# Patient Record
Sex: FEMALE | Race: WHITE | Employment: OTHER | URBAN - METROPOLITAN AREA
[De-identification: names, ages, dates, MRNs, and addresses within clinical notes are randomized per-mention and may not be internally consistent; named-entity substitution may affect disease eponyms.]

---

## 2017-12-05 ENCOUNTER — EST. PATIENT EMERGENCY (OUTPATIENT)
Dept: URBAN - METROPOLITAN AREA CLINIC 36 | Facility: CLINIC | Age: 77
End: 2017-12-05

## 2017-12-05 DIAGNOSIS — H02.003: ICD-10-CM

## 2017-12-05 DIAGNOSIS — H02.105: ICD-10-CM

## 2017-12-05 DIAGNOSIS — H02.006: ICD-10-CM

## 2017-12-05 DIAGNOSIS — H02.102: ICD-10-CM

## 2017-12-05 PROCEDURE — G8482 FLU IMMUNIZE ORDER/ADMIN: HCPCS

## 2017-12-05 PROCEDURE — G8785 BP SCRN NO PERF AT INTERVAL: HCPCS

## 2017-12-05 PROCEDURE — 4040F PNEUMOC VAC/ADMIN/RCVD: CPT

## 2017-12-05 PROCEDURE — 99211 OFF/OP EST MAY X REQ PHY/QHP: CPT

## 2017-12-05 PROCEDURE — 1036F TOBACCO NON-USER: CPT

## 2017-12-05 PROCEDURE — G8427 DOCREV CUR MEDS BY ELIG CLIN: HCPCS

## 2017-12-05 ASSESSMENT — TONOMETRY
OD_IOP_MMHG: 18
OS_IOP_MMHG: 19

## 2017-12-05 ASSESSMENT — VISUAL ACUITY
OD_SC: 20/50
OS_SC: 20/100+1
OD_PH: 20/25
OS_PH: 20/25

## 2017-12-07 ENCOUNTER — CONSULT (OUTPATIENT)
Dept: URBAN - METROPOLITAN AREA CLINIC 36 | Facility: CLINIC | Age: 77
End: 2017-12-07

## 2017-12-07 DIAGNOSIS — H02.102: ICD-10-CM

## 2017-12-07 DIAGNOSIS — H02.105: ICD-10-CM

## 2017-12-07 PROCEDURE — 99213 OFFICE O/P EST LOW 20 MIN: CPT

## 2017-12-07 ASSESSMENT — VISUAL ACUITY
OD_CC: 20/20
OS_CC: 20/25

## 2018-02-22 ENCOUNTER — POST-OP (OUTPATIENT)
Dept: URBAN - METROPOLITAN AREA CLINIC 43 | Facility: CLINIC | Age: 78
End: 2018-02-22

## 2018-02-22 DIAGNOSIS — Z98.890: ICD-10-CM

## 2018-02-22 PROCEDURE — 99024 POSTOP FOLLOW-UP VISIT: CPT

## 2018-02-22 ASSESSMENT — VISUAL ACUITY
OD_CC: 20/30
OS_CC: 20/30-1

## 2018-06-14 ENCOUNTER — ESTABLISHED COMPREHENSIVE EXAM (OUTPATIENT)
Dept: URBAN - METROPOLITAN AREA CLINIC 36 | Facility: CLINIC | Age: 78
End: 2018-06-14

## 2018-06-14 DIAGNOSIS — H52.7: ICD-10-CM

## 2018-06-14 PROCEDURE — 92015 DETERMINE REFRACTIVE STATE: CPT

## 2018-06-14 PROCEDURE — 92014 COMPRE OPH EXAM EST PT 1/>: CPT

## 2018-06-14 ASSESSMENT — VISUAL ACUITY
OD_CC: J1+
OS_CC: J1+
OS_CC: 20/20
OD_CC: 20/20

## 2018-06-14 ASSESSMENT — TONOMETRY
OS_IOP_MMHG: 17
OS_IOP_MMHG: 16
OD_IOP_MMHG: 15
OD_IOP_MMHG: 16

## 2018-06-21 ENCOUNTER — ESTABLISHED PATIENT (OUTPATIENT)
Dept: URBAN - METROPOLITAN AREA CLINIC 36 | Facility: CLINIC | Age: 78
End: 2018-06-21

## 2018-06-21 DIAGNOSIS — H40.013: ICD-10-CM

## 2018-06-21 DIAGNOSIS — M31.6: ICD-10-CM

## 2018-06-21 PROCEDURE — 92014 COMPRE OPH EXAM EST PT 1/>: CPT

## 2018-06-21 PROCEDURE — 92133 CPTRZD OPH DX IMG PST SGM ON: CPT

## 2018-06-21 ASSESSMENT — VISUAL ACUITY
OD_CC: 20/20-1
OD_CC: J1
OS_CC: J2
OS_CC: 20/20-2

## 2018-06-21 ASSESSMENT — TONOMETRY
OS_IOP_MMHG: 16
OD_IOP_MMHG: 16

## 2019-03-18 ENCOUNTER — IOP CHECK (OUTPATIENT)
Dept: URBAN - METROPOLITAN AREA CLINIC 36 | Facility: CLINIC | Age: 79
End: 2019-03-18

## 2019-03-18 DIAGNOSIS — H40.013: ICD-10-CM

## 2019-03-18 DIAGNOSIS — M31.6: ICD-10-CM

## 2019-03-18 PROCEDURE — 92012 INTRM OPH EXAM EST PATIENT: CPT

## 2019-03-18 PROCEDURE — 92083 EXTENDED VISUAL FIELD XM: CPT

## 2019-03-18 ASSESSMENT — VISUAL ACUITY
OS_CC: 20/25
OD_CC: 20/20

## 2019-03-18 ASSESSMENT — TONOMETRY
OD_IOP_MMHG: 16
OS_IOP_MMHG: 15

## 2019-06-13 ENCOUNTER — ESTABLISHED COMPREHENSIVE EXAM (OUTPATIENT)
Dept: URBAN - METROPOLITAN AREA CLINIC 36 | Facility: CLINIC | Age: 79
End: 2019-06-13

## 2019-06-13 DIAGNOSIS — H52.7: ICD-10-CM

## 2019-06-13 DIAGNOSIS — H40.013: ICD-10-CM

## 2019-06-13 DIAGNOSIS — H26.493: ICD-10-CM

## 2019-06-13 PROCEDURE — 92014 COMPRE OPH EXAM EST PT 1/>: CPT

## 2019-06-13 PROCEDURE — 92015 DETERMINE REFRACTIVE STATE: CPT

## 2019-06-13 ASSESSMENT — TONOMETRY
OD_IOP_MMHG: 17
OS_IOP_MMHG: 18
OD_IOP_MMHG: 20
OS_IOP_MMHG: 20

## 2019-06-13 ASSESSMENT — VISUAL ACUITY
OS_CC: 20/50
OD_CC: 20/20
OS_CC: J2
OD_BAT: 20/70
OS_BAT: 20/60
OD_SC: 20/25
OS_SC: 20/400
OS_PH: 20/25
OS_SC: <J12
OD_CC: J1+
OD_SC: J6

## 2019-11-14 ENCOUNTER — EST. PATIENT EMERGENCY (OUTPATIENT)
Dept: URBAN - METROPOLITAN AREA CLINIC 36 | Facility: CLINIC | Age: 79
End: 2019-11-14

## 2019-11-14 DIAGNOSIS — H02.105: ICD-10-CM

## 2019-11-14 PROCEDURE — 92012 INTRM OPH EXAM EST PATIENT: CPT

## 2019-11-18 ASSESSMENT — VISUAL ACUITY
OS_CC: 20/20-2
OD_CC: 20/20-1

## 2019-11-18 ASSESSMENT — TONOMETRY
OD_IOP_MMHG: 14
OS_IOP_MMHG: 16

## 2019-11-21 ENCOUNTER — ESTABLISHED PATIENT (OUTPATIENT)
Dept: URBAN - METROPOLITAN AREA CLINIC 36 | Facility: CLINIC | Age: 79
End: 2019-11-21

## 2019-11-21 DIAGNOSIS — H02.105: ICD-10-CM

## 2019-11-21 PROCEDURE — 99212 OFFICE O/P EST SF 10 MIN: CPT

## 2019-11-21 PROCEDURE — 92285 EXTERNAL OCULAR PHOTOGRAPHY: CPT

## 2019-11-21 ASSESSMENT — VISUAL ACUITY
OS_CC: 20/20
OD_CC: 20/20

## 2020-02-13 ENCOUNTER — PRE-OP/H&P (OUTPATIENT)
Dept: URBAN - METROPOLITAN AREA CLINIC 36 | Facility: CLINIC | Age: 80
End: 2020-02-13

## 2020-02-13 DIAGNOSIS — H02.105: ICD-10-CM

## 2020-02-13 PROCEDURE — 99211HP H&P OFFICE/OUTPATIENT VISIT, EST

## 2020-02-13 RX ORDER — ERYTHROMYCIN 5 MG/G
OINTMENT OPHTHALMIC
Start: 2020-03-02

## 2020-02-13 RX ORDER — TRAMADOL HCL 50 MG/1
1 TABLET ORAL
Start: 2020-03-02

## 2020-03-02 ENCOUNTER — PRE-OP/H&P (OUTPATIENT)
Dept: URBAN - METROPOLITAN AREA SURGERY 14 | Facility: SURGERY | Age: 80
End: 2020-03-02

## 2020-03-02 ENCOUNTER — SURGERY/PROCEDURE (OUTPATIENT)
Dept: URBAN - METROPOLITAN AREA SURGERY 14 | Facility: SURGERY | Age: 80
End: 2020-03-02

## 2020-03-02 DIAGNOSIS — H02.105: ICD-10-CM

## 2020-03-02 PROCEDURE — 67950 REVISION OF EYELID: CPT

## 2020-03-02 PROCEDURE — 68320 REVISE/GRAFT EYELID LINING: CPT

## 2020-03-02 PROCEDURE — 99499 UNLISTED E&M SERVICE: CPT

## 2020-03-02 PROCEDURE — 68440 SNIP INC LACRIMAL PUNCTUM: CPT

## 2020-03-12 ENCOUNTER — POST-OP (OUTPATIENT)
Dept: URBAN - METROPOLITAN AREA CLINIC 44 | Facility: CLINIC | Age: 80
End: 2020-03-12

## 2020-03-12 DIAGNOSIS — Z98.890: ICD-10-CM

## 2020-03-12 PROCEDURE — 99024 POSTOP FOLLOW-UP VISIT: CPT

## 2020-03-12 ASSESSMENT — VISUAL ACUITY
OD_SC: 20/20
OS_SC: 20/20

## 2020-09-17 ENCOUNTER — ESTABLISHED COMPREHENSIVE EXAM (OUTPATIENT)
Dept: URBAN - METROPOLITAN AREA CLINIC 36 | Facility: CLINIC | Age: 80
End: 2020-09-17

## 2020-09-17 DIAGNOSIS — H40.013: ICD-10-CM

## 2020-09-17 DIAGNOSIS — H50.21: ICD-10-CM

## 2020-09-17 DIAGNOSIS — H02.105: ICD-10-CM

## 2020-09-17 DIAGNOSIS — H52.7: ICD-10-CM

## 2020-09-17 DIAGNOSIS — H04.123: ICD-10-CM

## 2020-09-17 DIAGNOSIS — H26.493: ICD-10-CM

## 2020-09-17 PROCEDURE — 92014 COMPRE OPH EXAM EST PT 1/>: CPT

## 2020-09-17 PROCEDURE — 92015 DETERMINE REFRACTIVE STATE: CPT

## 2020-09-17 ASSESSMENT — TONOMETRY
OD_IOP_MMHG: 16
OS_IOP_MMHG: 18
OS_IOP_MMHG: 16
OD_IOP_MMHG: 17

## 2020-09-17 ASSESSMENT — VISUAL ACUITY
OS_CC: 20/20-1
OD_SC: 20/30
OD_CC: J1
OS_SC: 20/200
OS_CC: J1
OD_CC: 20/25-1
OD_SC: J5
OS_SC: >J12

## 2022-05-04 ENCOUNTER — COMPREHENSIVE EXAM (OUTPATIENT)
Dept: URBAN - METROPOLITAN AREA CLINIC 36 | Facility: CLINIC | Age: 82
End: 2022-05-04

## 2022-05-04 DIAGNOSIS — H16.143: ICD-10-CM

## 2022-05-04 DIAGNOSIS — Z96.1: ICD-10-CM

## 2022-05-04 DIAGNOSIS — H40.013: ICD-10-CM

## 2022-05-04 DIAGNOSIS — H52.7: ICD-10-CM

## 2022-05-04 DIAGNOSIS — H02.105: ICD-10-CM

## 2022-05-04 DIAGNOSIS — H50.21: ICD-10-CM

## 2022-05-04 DIAGNOSIS — H04.562: ICD-10-CM

## 2022-05-04 DIAGNOSIS — H04.123: ICD-10-CM

## 2022-05-04 DIAGNOSIS — H26.493: ICD-10-CM

## 2022-05-04 PROCEDURE — 92015 DETERMINE REFRACTIVE STATE: CPT

## 2022-05-04 PROCEDURE — 92014 COMPRE OPH EXAM EST PT 1/>: CPT

## 2022-05-04 PROCEDURE — A4262 TEMPORARY TEAR DUCT PLUG: HCPCS

## 2022-05-04 PROCEDURE — 68761Q PUNCTAL PLUG/QUINTESS DISSOLVABLE PLUG/EACH

## 2022-05-04 ASSESSMENT — VISUAL ACUITY
OD_SC: 20/60-1
OD_CC: J2
OS_SC: 20/200
OD_CC: 20/30-1
OS_CC: 20/25-1
OS_CC: J2

## 2022-05-04 ASSESSMENT — TONOMETRY
OD_IOP_MMHG: 10
OS_IOP_MMHG: 10

## 2022-05-10 NOTE — PATIENT DISCUSSION
Continue care with PCP [facility]. Monitor blood sugar and A1C levels. Recommended yearly dilated eye exams to monitor for ocular complications.

## 2022-12-12 ENCOUNTER — ESTABLISHED PATIENT (OUTPATIENT)
Dept: URBAN - METROPOLITAN AREA CLINIC 36 | Facility: CLINIC | Age: 82
End: 2022-12-12

## 2022-12-12 PROCEDURE — A4262 TEMPORARY TEAR DUCT PLUG: HCPCS

## 2022-12-12 PROCEDURE — 92012 INTRM OPH EXAM EST PATIENT: CPT

## 2022-12-12 PROCEDURE — 68761Q PUNCTAL PLUG/QUINTESS DISSOLVABLE PLUG/EACH

## 2022-12-12 ASSESSMENT — TONOMETRY
OS_IOP_MMHG: 17
OD_IOP_MMHG: 17

## 2022-12-12 ASSESSMENT — VISUAL ACUITY
OS_CC: 20/25
OD_CC: 20/25-1

## 2023-06-13 ENCOUNTER — COMPREHENSIVE EXAM (OUTPATIENT)
Dept: URBAN - METROPOLITAN AREA CLINIC 36 | Facility: CLINIC | Age: 83
End: 2023-06-13

## 2023-06-13 DIAGNOSIS — H40.013: ICD-10-CM

## 2023-06-13 DIAGNOSIS — H50.21: ICD-10-CM

## 2023-06-13 DIAGNOSIS — H16.223: ICD-10-CM

## 2023-06-13 DIAGNOSIS — Z98.890: ICD-10-CM

## 2023-06-13 DIAGNOSIS — H16.143: ICD-10-CM

## 2023-06-13 DIAGNOSIS — H26.493: ICD-10-CM

## 2023-06-13 DIAGNOSIS — H52.7: ICD-10-CM

## 2023-06-13 DIAGNOSIS — Z96.1: ICD-10-CM

## 2023-06-13 DIAGNOSIS — H02.105: ICD-10-CM

## 2023-06-13 DIAGNOSIS — H04.123: ICD-10-CM

## 2023-06-13 PROCEDURE — 6876050 CLOSURE OF LAC PUNCTUM BILATERAL

## 2023-06-13 PROCEDURE — 92014 COMPRE OPH EXAM EST PT 1/>: CPT

## 2023-06-13 PROCEDURE — 92015 DETERMINE REFRACTIVE STATE: CPT

## 2023-06-13 PROCEDURE — 68761Q PUNCTAL PLUG/QUINTESS DISSOLVABLE PLUG/EACH

## 2023-06-13 ASSESSMENT — TONOMETRY
OD_IOP_MMHG: 11
OS_IOP_MMHG: 13

## 2023-06-13 ASSESSMENT — VISUAL ACUITY
OD_SC: J>10
OD_CC: 20/20-1
OS_CC: J3
OS_SC: J>10
OS_SC: 20/200
OS_CC: 20/20-1
OD_SC: 20/60+2
OD_CC: J2

## 2023-12-14 ENCOUNTER — FOLLOW UP (OUTPATIENT)
Dept: URBAN - METROPOLITAN AREA CLINIC 36 | Facility: CLINIC | Age: 83
End: 2023-12-14

## 2023-12-14 DIAGNOSIS — H16.223: ICD-10-CM

## 2023-12-14 PROCEDURE — 6876050 CLOSURE OF LAC PUNCTUM BILATERAL

## 2023-12-14 PROCEDURE — 68761Q PUNCTAL PLUG/QUINTESS DISSOLVABLE PLUG/EACH

## 2023-12-14 ASSESSMENT — VISUAL ACUITY
OS_CC: 20/20
OD_CC: 20/25-1

## 2024-06-26 ENCOUNTER — COMPREHENSIVE EXAM (OUTPATIENT)
Dept: URBAN - METROPOLITAN AREA CLINIC 36 | Facility: CLINIC | Age: 84
End: 2024-06-26

## 2024-06-26 DIAGNOSIS — H50.21: ICD-10-CM

## 2024-06-26 DIAGNOSIS — Z98.890: ICD-10-CM

## 2024-06-26 DIAGNOSIS — Z96.1: ICD-10-CM

## 2024-06-26 DIAGNOSIS — H16.143: ICD-10-CM

## 2024-06-26 DIAGNOSIS — H16.223: ICD-10-CM

## 2024-06-26 DIAGNOSIS — H52.7: ICD-10-CM

## 2024-06-26 DIAGNOSIS — H26.493: ICD-10-CM

## 2024-06-26 DIAGNOSIS — H40.013: ICD-10-CM

## 2024-06-26 DIAGNOSIS — H04.123: ICD-10-CM

## 2024-06-26 PROCEDURE — 92014 COMPRE OPH EXAM EST PT 1/>: CPT

## 2024-06-26 PROCEDURE — 92015 DETERMINE REFRACTIVE STATE: CPT

## 2024-06-26 ASSESSMENT — TONOMETRY
OD_IOP_MMHG: 12
OS_IOP_MMHG: 13

## 2024-06-26 ASSESSMENT — VISUAL ACUITY
OS_SC: J>12
OD_SC: 20/60-1
OS_CC: J3
OS_SC: 20/200
OD_CC: J2
OD_SC: J>12
OD_CC: 20/30+1
OS_CC: 20/20-1

## 2024-06-28 ENCOUNTER — FOLLOW UP (OUTPATIENT)
Dept: URBAN - METROPOLITAN AREA CLINIC 36 | Facility: CLINIC | Age: 84
End: 2024-06-28

## 2024-06-28 DIAGNOSIS — H04.123: ICD-10-CM

## 2024-06-28 PROCEDURE — A4262 TEMPORARY TEAR DUCT PLUG: HCPCS

## 2024-06-28 PROCEDURE — 68761Q PUNCTAL PLUG/QUINTESS DISSOLVABLE PLUG/EACH

## 2024-06-28 PROCEDURE — 92012 INTRM OPH EXAM EST PATIENT: CPT

## 2024-06-28 ASSESSMENT — VISUAL ACUITY
OS_CC: 20/20
OD_CC: 20/25-2

## 2024-07-31 ENCOUNTER — EMERGENCY VISIT (OUTPATIENT)
Dept: URBAN - METROPOLITAN AREA CLINIC 36 | Facility: CLINIC | Age: 84
End: 2024-07-31

## 2024-07-31 DIAGNOSIS — H04.123: ICD-10-CM

## 2024-07-31 PROCEDURE — 92012 INTRM OPH EXAM EST PATIENT: CPT

## 2024-07-31 ASSESSMENT — VISUAL ACUITY
OS_CC: 20/20
OD_CC: 20/30

## 2024-12-19 ENCOUNTER — FOLLOW UP (OUTPATIENT)
Age: 84
End: 2024-12-19

## 2024-12-19 DIAGNOSIS — H16.223: ICD-10-CM

## 2024-12-19 DIAGNOSIS — H04.123: ICD-10-CM

## 2024-12-19 PROCEDURE — 68761Q PUNCTAL PLUG/QUINTESS DISSOLVABLE PLUG/EACH: Mod: E2,E4

## 2024-12-19 PROCEDURE — A4262 TEMPORARY TEAR DUCT PLUG: HCPCS | Mod: E2,E4

## 2024-12-19 PROCEDURE — 92012 INTRM OPH EXAM EST PATIENT: CPT | Mod: 25

## 2025-06-03 ENCOUNTER — COMPREHENSIVE EXAM (OUTPATIENT)
Age: 85
End: 2025-06-03

## 2025-06-03 DIAGNOSIS — Z98.890: ICD-10-CM

## 2025-06-03 DIAGNOSIS — H16.143: ICD-10-CM

## 2025-06-03 DIAGNOSIS — M31.6: ICD-10-CM

## 2025-06-03 DIAGNOSIS — Z96.1: ICD-10-CM

## 2025-06-03 DIAGNOSIS — H52.7: ICD-10-CM

## 2025-06-03 DIAGNOSIS — H04.563: ICD-10-CM

## 2025-06-03 DIAGNOSIS — H40.013: ICD-10-CM

## 2025-06-03 DIAGNOSIS — H50.21: ICD-10-CM

## 2025-06-03 DIAGNOSIS — H26.493: ICD-10-CM

## 2025-06-03 DIAGNOSIS — H16.223: ICD-10-CM

## 2025-06-03 DIAGNOSIS — H04.562: ICD-10-CM

## 2025-06-03 DIAGNOSIS — H04.123: ICD-10-CM

## 2025-06-03 PROCEDURE — 68761Q PUNCTAL PLUG/QUINTESS DISSOLVABLE PLUG/EACH: Mod: E2,E4

## 2025-06-03 PROCEDURE — 92014 COMPRE OPH EXAM EST PT 1/>: CPT | Mod: 25

## 2025-06-03 PROCEDURE — 92015 DETERMINE REFRACTIVE STATE: CPT

## 2025-06-03 PROCEDURE — A4262 TEMPORARY TEAR DUCT PLUG: HCPCS | Mod: E2,E4
